# Patient Record
Sex: MALE | Race: WHITE | HISPANIC OR LATINO | Employment: UNEMPLOYED | URBAN - METROPOLITAN AREA
[De-identification: names, ages, dates, MRNs, and addresses within clinical notes are randomized per-mention and may not be internally consistent; named-entity substitution may affect disease eponyms.]

---

## 2017-08-16 ENCOUNTER — HOSPITAL ENCOUNTER (EMERGENCY)
Facility: HOSPITAL | Age: 52
Discharge: HOME | End: 2017-08-16
Attending: EMERGENCY MEDICINE

## 2017-08-16 VITALS
HEIGHT: 72 IN | DIASTOLIC BLOOD PRESSURE: 92 MMHG | RESPIRATION RATE: 16 BRPM | HEART RATE: 97 BPM | OXYGEN SATURATION: 98 % | BODY MASS INDEX: 24.38 KG/M2 | WEIGHT: 180 LBS | TEMPERATURE: 99 F | SYSTOLIC BLOOD PRESSURE: 209 MMHG

## 2017-08-16 DIAGNOSIS — G89.11 ACUTE PAIN OF RIGHT SHOULDER DUE TO TRAUMA: ICD-10-CM

## 2017-08-16 DIAGNOSIS — M25.511 ACUTE PAIN OF RIGHT SHOULDER DUE TO TRAUMA: ICD-10-CM

## 2017-08-16 DIAGNOSIS — K40.90 REDUCIBLE LEFT INGUINAL HERNIA: ICD-10-CM

## 2017-08-16 DIAGNOSIS — M75.81 RIGHT ROTATOR CUFF TENDINITIS: Primary | ICD-10-CM

## 2017-08-16 PROCEDURE — 99283 EMERGENCY DEPT VISIT LOW MDM: CPT

## 2017-08-16 PROCEDURE — 99284 EMERGENCY DEPT VISIT MOD MDM: CPT | Mod: ,,, | Performed by: FAMILY MEDICINE

## 2017-08-16 RX ORDER — METHYLPREDNISOLONE 4 MG/1
TABLET ORAL
Qty: 1 PACKAGE | Refills: 0 | Status: SHIPPED | OUTPATIENT
Start: 2017-08-16 | End: 2017-09-06

## 2017-08-16 NOTE — ED PROVIDER NOTES
Encounter Date: 8/16/2017    SCRIBE #1 NOTE: I, Sanjuanita Thurman, am scribing for, and in the presence of, Dr. Dalton.       History     Chief Complaint   Patient presents with    Shoulder Pain     states injured right shoulder last week     Time seen by provider: 10:12 AM    This is a 51 y.o. male who presents with complaint of RUE and LLE injury and associated pain for approximately 7 days.  He reports inability to sleep due to the pain. He notes decreased ROM in his right shoulder.  The patient indicates that the pain improves with Advil PO.      The history is provided by the patient.     Review of patient's allergies indicates:  No Known Allergies  History reviewed. No pertinent past medical history.  History reviewed. No pertinent surgical history.  Family History   Problem Relation Age of Onset    No Known Problems Mother     No Known Problems Father      Social History   Substance Use Topics    Smoking status: Never Smoker    Smokeless tobacco: Never Used    Alcohol use Yes      Comment: socially     Review of Systems   Constitutional: Positive for fatigue (Inability to sleep due to pain).   Musculoskeletal:        RUE and LLE pain.       Physical Exam     Initial Vitals [08/16/17 0928]   BP Pulse Resp Temp SpO2   (!) 209/92 97 16 98.9 °F (37.2 °C) 98 %      MAP       131         Physical Exam    Nursing note and vitals reviewed.  Constitutional: No distress.   HENT:   Head: Normocephalic and atraumatic.   Cardiovascular: Normal rate, regular rhythm and normal heart sounds. Exam reveals no gallop and no friction rub.    No murmur heard.  Pulmonary/Chest: Breath sounds normal. No respiratory distress. He has no wheezes. He has no rhonchi. He has no rales.   Abdominal: Soft. He exhibits no distension. There is no tenderness. There is no rebound and no guarding.   Musculoskeletal:   Peripheral vascular at baseline.    Extremities: Limited ROM of right shoulder to abduction past 75 degrees. Flexion and internal  rotation at baseline. Strength and coordination in right elbow, wrist, and hand at baseline.    Groin: Left inguinal hernia, easily reducible but uncomfortable to palpation and source of his hip pain.  Full ROM in bilateral hips without pain.   Neurological: He is alert and oriented to person, place, and time. No cranial nerve deficit or sensory deficit.         ED Course   Procedures  Labs Reviewed - No data to display          Medical Decision Making:   History:   Old Medical Records: I decided to obtain old medical records.  Clinical Tests:   Radiological Study: Ordered and Reviewed  ED Management:  Patient discharged on a short course of steroids and recommendations for orthopedic follow-up            Scribe Attestation:   Scribe #1: I performed the above scribed service and the documentation accurately describes the services I performed. I attest to the accuracy of the note.    Attending Attestation:           Physician Attestation for Scribe:  Physician Attestation Statement for Scribe #1: I, Dr. Dalton, reviewed documentation, as scribed by Sanjuanita Thurman in my presence, and it is both accurate and complete.                 ED Course     Clinical Impression:   The primary encounter diagnosis was Right rotator cuff tendinitis. Diagnoses of Acute pain of right shoulder due to trauma and Reducible left inguinal hernia were also pertinent to this visit.    Disposition:   Disposition: Discharged  Condition: Stable                        Melvin Dalton MD  08/29/17 1051

## 2017-08-16 NOTE — DISCHARGE INSTRUCTIONS
You have two problems.      You have injured the tendons in your left shoulder and may have small tears.  Your x-rays are normal, so you are not in danger.  We will place you on a one week course of steroids to try to reduce the inflammation since the Advil is not working.  We will also recommend some low impact exercises to see if that helps your shoulder motion to return to normal.      However, sometimes these injuries can result in permanent reduced function of your shoulder.  You should follow up with our orthopedic dept. or your company worker's comp doctors for further evaluation and management, especially if your movement has not returned to normal after the steroid treatment and exercises. .     You also have a hernia in your left groin.  This is what is causing your pain in this area.  It is not trapped, and does not require emergency surgery.  However, since it is causing you continuous pain, surgery is probably the best option to fix it.  Medicines really do not help.  You should follow up w/ our General Surgery clinic to discuss repair options.

## 2017-08-16 NOTE — ED TRIAGE NOTES
Sustained injury to right shoulder last week while he was doing some heavy lifting.  States that the pain is keeping him up at night.    Also feels like he pulled something in his left groin area.    GENERAL: The patient is well-developed and well-nourished in no apparent distress. Alert and oriented x4.                                                HEENT: Head is normocephalic and atraumatic. Extraocular muscles are intact. Pupils are equal, round, and reactive to light and accommodation. Nares appeared normal. Mouth is well hydrated and without lesions. Mucous membranes are moist. Posterior pharynx clear of any exudate or lesions.    NECK: Supple. No carotid bruits. No lymphadenopathy or thyromegaly.    LUNGS: Clear to auscultation.    HEART: Regular rate and rhythm without murmur.     ABDOMEN: Soft, nontender, and nondistended. Positive bowel sounds. No hepatosplenomegaly was noted.     EXTREMITIES: Without any cyanosis, clubbing, rash, lesions or edema.     NEUROLOGIC: Cranial nerves II through XII are grossly intact.     PSYCHIATRIC: Flat affect, but denies suicidal or homicidal ideations.    SKIN: No ulceration or induration present.

## 2022-02-25 ENCOUNTER — CLINICAL SUPPORT (OUTPATIENT)
Dept: LAB | Facility: HOSPITAL | Age: 57
End: 2022-02-25
Attending: NURSE PRACTITIONER
Payer: MEDICAID

## 2022-02-25 DIAGNOSIS — Z02.79 OTHER ISSUE OF MEDICAL CERTIFICATES: Primary | ICD-10-CM

## 2022-02-25 DIAGNOSIS — Z02.79 OTHER ISSUE OF MEDICAL CERTIFICATES: ICD-10-CM

## 2022-02-25 PROCEDURE — 93010 ELECTROCARDIOGRAM REPORT: CPT | Mod: ,,, | Performed by: INTERNAL MEDICINE

## 2022-02-25 PROCEDURE — 93010 EKG 12-LEAD: ICD-10-PCS | Mod: ,,, | Performed by: INTERNAL MEDICINE

## 2022-02-25 PROCEDURE — 93005 ELECTROCARDIOGRAM TRACING: CPT

## 2024-05-02 ENCOUNTER — HOSPITAL ENCOUNTER (EMERGENCY)
Facility: HOSPITAL | Age: 59
Discharge: HOME | End: 2024-05-02
Attending: EMERGENCY MEDICINE

## 2024-05-02 VITALS
TEMPERATURE: 98 F | OXYGEN SATURATION: 98 % | DIASTOLIC BLOOD PRESSURE: 69 MMHG | HEART RATE: 61 BPM | SYSTOLIC BLOOD PRESSURE: 128 MMHG | RESPIRATION RATE: 18 BRPM

## 2024-05-02 DIAGNOSIS — S68.119A: Primary | ICD-10-CM

## 2024-05-02 PROBLEM — S68.625A: Status: ACTIVE | Noted: 2024-05-02

## 2024-05-02 LAB
HCV AB SERPL QL IA: NORMAL
HIV 1+2 AB+HIV1 P24 AG SERPL QL IA: NORMAL

## 2024-05-02 PROCEDURE — 96366 THER/PROPH/DIAG IV INF ADDON: CPT

## 2024-05-02 PROCEDURE — 99284 EMERGENCY DEPT VISIT MOD MDM: CPT | Mod: 25

## 2024-05-02 PROCEDURE — 11760 REPAIR OF NAIL BED: CPT

## 2024-05-02 PROCEDURE — 86803 HEPATITIS C AB TEST: CPT | Performed by: EMERGENCY MEDICINE

## 2024-05-02 PROCEDURE — 96365 THER/PROPH/DIAG IV INF INIT: CPT

## 2024-05-02 PROCEDURE — 63600175 PHARM REV CODE 636 W HCPCS: Performed by: EMERGENCY MEDICINE

## 2024-05-02 PROCEDURE — 87389 HIV-1 AG W/HIV-1&-2 AB AG IA: CPT | Performed by: EMERGENCY MEDICINE

## 2024-05-02 PROCEDURE — 25000003 PHARM REV CODE 250: Performed by: EMERGENCY MEDICINE

## 2024-05-02 RX ORDER — OXYCODONE AND ACETAMINOPHEN 5; 325 MG/1; MG/1
1 TABLET ORAL EVERY 6 HOURS PRN
Qty: 8 TABLET | Refills: 0 | Status: SHIPPED | OUTPATIENT
Start: 2024-05-02

## 2024-05-02 RX ORDER — IBUPROFEN 400 MG/1
400 TABLET ORAL EVERY 6 HOURS PRN
Qty: 20 TABLET | Refills: 0 | Status: SHIPPED | OUTPATIENT
Start: 2024-05-02

## 2024-05-02 RX ORDER — LIDOCAINE HYDROCHLORIDE 10 MG/ML
10 INJECTION, SOLUTION EPIDURAL; INFILTRATION; INTRACAUDAL; PERINEURAL
Status: DISCONTINUED | OUTPATIENT
Start: 2024-05-02 | End: 2024-05-02 | Stop reason: HOSPADM

## 2024-05-02 RX ORDER — SULFAMETHOXAZOLE AND TRIMETHOPRIM 800; 160 MG/1; MG/1
1 TABLET ORAL 2 TIMES DAILY
Qty: 14 TABLET | Refills: 0 | Status: SHIPPED | OUTPATIENT
Start: 2024-05-02 | End: 2024-05-12

## 2024-05-02 RX ADMIN — CEFAZOLIN 2 G: 2 INJECTION, POWDER, FOR SOLUTION INTRAMUSCULAR; INTRAVENOUS at 04:05

## 2024-05-02 NOTE — PROVIDER PROGRESS NOTES - EMERGENCY DEPT.
Encounter Date: 5/2/2024    ED Physician Progress Notes        ED Course: I, Gary Camilo MD have assumed care of this patient from Dr. Foster. Patient is a 58-year-old male presenting with left hand 4th digit distal amputation.    At the time of signout plan was pending orthopedics consultation recommendations..    Patient seen by orthopedics and cared for patient placed in bandages plan for patient DC home with Bactrim follow-up wound 1 week as outpatient.    Spoke with patient understanding of plan will DC home given return precautions including worsening pain, fevers, chills, abnormal bleeding, discharge new onset numbness or tingling.  Patient understanding of plan safe for DC at this time.  6:03 PM      Medications given in the ED:    Medications   LIDOcaine (PF) 10 mg/ml (1%) injection 100 mg (has no administration in time range)   ceFAZolin 2 g in dextrose 5 % in water (D5W) 50 mL IVPB (MB+) (2 g Intravenous New Bag 5/2/24 1640)       Disposition: d/c home     Impression: left hand 4th digit distal amputation

## 2024-05-02 NOTE — ED PROVIDER NOTES
Encounter Date: 5/2/2024       History     Chief Complaint   Patient presents with    Hand Injury     Left hand, bleeding controlled, tip of 4th digit cut off      Patient is a 58-year-old M with PMH of who presents with pain and amputation of L 4th finger involving the nail bed that occurred today after he was pressing wood into a .   Is R hand dominant    Had T dap in 2020    The history is provided by the patient.     Review of patient's allergies indicates:  No Known Allergies  No past medical history on file.  No past surgical history on file.  Family History   Problem Relation Name Age of Onset    No Known Problems Mother      No Known Problems Father       Social History     Tobacco Use    Smoking status: Never    Smokeless tobacco: Never   Substance Use Topics    Alcohol use: Yes     Comment: socially    Drug use: No         Physical Exam     Initial Vitals [05/02/24 1503]   BP Pulse Resp Temp SpO2   (!) 153/71 85 18 97.9 °F (36.6 °C) 95 %      MAP       --         Physical Exam    Nursing note and vitals reviewed.  Constitutional: He appears well-developed and well-nourished. He is not diaphoretic. No distress.   HENT:   Head: Normocephalic and atraumatic.   Eyes: EOM are normal.   Neck: Neck supple.   Normal range of motion.  Cardiovascular:  Normal rate and regular rhythm.           Pulmonary/Chest: No respiratory distress.   Abdominal: He exhibits no distension.   Musculoskeletal:         General: Normal range of motion.      Cervical back: Normal range of motion and neck supple.      Comments: Amputation through L 4th finger through nail bed     Neurological: He is alert and oriented to person, place, and time. GCS score is 15. GCS eye subscore is 4. GCS verbal subscore is 5. GCS motor subscore is 6.   Skin: Skin is warm and dry.   Psychiatric: He has a normal mood and affect. His behavior is normal. Judgment and thought content normal.         ED Course   Procedures  Labs Reviewed   HIV 1 / 2  ANTIBODY    Narrative:     Release to patient->Immediate   HEPATITIS C ANTIBODY    Narrative:     Release to patient->Immediate          Imaging Results               X-Ray Hand 3 View Left (Final result)  Result time 05/02/24 17:50:26      Final result by Heber Delacruz MD (05/02/24 17:50:26)                   Impression:      1. Amputation injury at the distal margin of the distal phalanx of the 4th digit with associated soft tissue injury.  2. Probable small foreign bodies in the soft tissues adjacent to the distal phalanx of the 1st digit and dorsal surface of the 4th digit at the proximal interphalangeal joint.  Recommend clinical correlation.  3. This report was flagged in Epic as abnormal.      Electronically signed by: Heber Delacruz  Date:    05/02/2024  Time:    17:50               Narrative:    EXAMINATION:  XR HAND COMPLETE 3 VIEW LEFT    CLINICAL HISTORY:  left 4th finger amputation;.    TECHNIQUE:  PA, lateral, and oblique views of the left hand were performed.    COMPARISON:  None    FINDINGS:  Soft tissue injury with blunting of the distal margin of the distal phalanx of the 4th digit consistent with amputation injury.    No significant abnormality elsewhere.  No mass is detected.    Small metallic foreign body adjacent to the distal phalanx of the 1st digit.  Recommend clinical correlation.  There is a tiny punctate focus in the soft tissues at the dorsal surface of the 4th digit at the proximal interphalangeal joint level seen on lateral view, possibly at tiny punctate foreign body.  Recommend clinical correlation and follow-up.                                       Medications   ceFAZolin 2 g in dextrose 5 % in water (D5W) 50 mL IVPB (MB+) (0 g Intravenous Stopped 5/2/24 1820)     Medical Decision Making  DDX: open fracture, nail bed injury, amputation    Patient given ancef 2 grams iv, xray ordered, ortho consulted  Tetanus was up to date  Patient care transferred to Dr. Camilo pending ortho  consult    Amount and/or Complexity of Data Reviewed  Labs: ordered.  Radiology: ordered.                                      Clinical Impression:  Final diagnoses:  [S68.119A] Amputation of digit of left hand, initial encounter (Primary)          ED Disposition Condition    Discharge Stable          ED Prescriptions       Medication Sig Dispense Start Date End Date Auth. Provider    sulfamethoxazole-trimethoprim 800-160mg (BACTRIM DS) 800-160 mg Tab Take 1 tablet by mouth 2 (two) times daily. for 10 days 14 tablet 5/2/2024 5/12/2024 Gary Camilo Jr., MD    oxyCODONE-acetaminophen (PERCOCET) 5-325 mg per tablet Take 1 tablet by mouth every 6 (six) hours as needed for Pain. 8 tablet 5/2/2024 -- Gary Camilo Jr., MD    ibuprofen (ADVIL,MOTRIN) 400 MG tablet Take 1 tablet (400 mg total) by mouth every 6 (six) hours as needed. 20 tablet 5/2/2024 -- Gary Camilo Jr., MD          Follow-up Information       Follow up With Specialties Details Why Contact Info    Maria Luz Boykin MD ([facility]) Family Medicine In 1 week  [address] [facility] [address] [phone]      [facility] - Emergency Dept Emergency Medicine  If symptoms worsen [id number]  [facility] [address] [phone]             Sarah Foster MD  05/04/24 0054

## 2024-05-02 NOTE — ED NOTES
Patient identifiers verified and correct for Fili Brancho.   LOC: The patient is awake, alert and aware of environment with an appropriate affect, the patient is oriented x 3 and speaking appropriately.   APPEARANCE: Patient appears comfortable and in no acute distress, patient is clean and well groomed.  SKIN: The skin is warm and dry, color consistent with ethnicity, patient has normal skin turgor and moist mucus membranes, Pt lacerated tip of left ring finger.  MUSCULOSKELETAL: Patient moving all extremities spontaneously, no swelling noted.  RESPIRATORY: Airway is open and patent, respirations are spontaneous, patient has a normal effort and rate, no accessory muscle use noted, O2 sats noted at 95% on room air.  CARDIAC: Patient has a normal rate and regular rhythm, no edema noted, capillary refill < 3 seconds.   GASTRO: Soft and non tender to palpation, no distention noted, normoactive bowel sounds present in all four quadrants. Pt states bowel movements have been regular.  : Pt denies any pain or frequency with urination.  NEURO: Pt opens eyes spontaneously, behavior appropriate to situation, follows commands, facial expression symmetrical, bilateral hand grasp equal and even, purposeful motor response noted, normal sensation in all extremities when touched with a finger.    Ring removed from left ring finger.

## 2024-05-02 NOTE — Clinical Note
"Fili Mahajangallito Stallings was seen and treated in our emergency department on 5/2/2024.  He may return to work on 05/09/2024.  Follow up with hand specialist in 1 week for further recommendations     If you have any questions or concerns, please don't hesitate to call.       LPN    "

## 2024-05-02 NOTE — DISCHARGE INSTRUCTIONS
Please return if having any worsening pain, numbness, tingling, fevers chills notice any abnormal pus or bleeding from wound.  Please follow-up in 1 week with a hand specialist [phone]

## 2024-05-03 NOTE — SUBJECTIVE & OBJECTIVE
No past medical history on file.    No past surgical history on file.    Review of patient's allergies indicates:  No Known Allergies    No current facility-administered medications for this encounter.     Current Outpatient Medications   Medication Sig Dispense Refill    ibuprofen (ADVIL,MOTRIN) 400 MG tablet Take 1 tablet (400 mg total) by mouth every 6 (six) hours as needed. 20 tablet 0    oxyCODONE-acetaminophen (PERCOCET) 5-325 mg per tablet Take 1 tablet by mouth every 6 (six) hours as needed for Pain. 8 tablet 0    sulfamethoxazole-trimethoprim 800-160mg (BACTRIM DS) 800-160 mg Tab Take 1 tablet by mouth 2 (two) times daily. for 10 days 14 tablet 0     Family History       Problem Relation (Age of Onset)    No Known Problems Mother, Father          Tobacco Use    Smoking status: Never    Smokeless tobacco: Never   Substance and Sexual Activity    Alcohol use: Yes     Comment: socially    Drug use: No    Sexual activity: Yes     Partners: Female     Birth control/protection: None     ROS  Constitutional: negative for fevers  Eyes: negative visual changes  ENT: negative for hearing loss  Respiratory: negative for dyspnea  Cardiovascular: negative for chest pain  Gastrointestinal: negative for abdominal pain  Genitourinary: negative for dysuria  Neurological: negative for headaches  Behavioral/Psych: negative for hallucinations  Endocrine: negative for temperature intolerance    Objective:     Vital Signs (Most Recent):  Temp: 97.9 °F (36.6 °C) (05/02/24 1736)  Pulse: 61 (05/02/24 1736)  Resp: 18 (05/02/24 1736)  BP: 128/69 (05/02/24 1736)  SpO2: 98 % (05/02/24 1736) Vital Signs (24h Range):  Temp:  [97.9 °F (36.6 °C)] 97.9 °F (36.6 °C)  Pulse:  [61-85] 61  Resp:  [18] 18  SpO2:  [95 %-98 %] 98 %  BP: (128-153)/(69-71) 128/69           There is no height or weight on file to calculate BMI.      Intake/Output Summary (Last 24 hours) at 5/2/2024 5942  Last data filed at 5/2/2024 1820  Gross per 24 hour   Intake 50  ml   Output --   Net 50 ml        Ortho/SPM Exam  General:  no acute distress, appears stated age   Neuro: alert and oriented x3  Psych: normal mood  Head: normocephalic, atraumatic.  Eyes: no scleral icterus  Mouth: moist mucous membranes  Cardiovascular: extremities warm and well perfused  Lungs: breathing comfortably, equal chest rise bilat  Skin: clean, dry, intact (any exceptions noted in below musculoskeletal exam)    MSK:  RUE:  - Skin intact throughout, no open wounds  - No swelling  - No ecchymosis, erythema, or signs of cellulitis  - NonTTP throughout  - ROM intact without pain  - SILT throughout  - Compartments soft  - Radial artery palpated   - Capillary Refill <3s    LUE:  - Partial amputation of distal tip of 4th digit  - Moderate swelling to 4th digit  - NonTTP throughout  - AROM and PROM of the shoulder, elbow, wrist, and hand intact without pain  - Axillary/AIN/PIN/Radial/Median/Ulnar Nerves assessed in isolation without deficit  - SILT throughout  - Compartments soft  - Radial artery palpated   - Capillary Refill <3s    Bilateral LE:  ROM grossly intact without pain  NonTTP throughout         Significant Labs:   All pertinent labs within the past 24 hours have been reviewed.    Significant Imaging: I have reviewed and interpreted all pertinent imaging results/findings.  XR L hand: amputation to distal tip of distal phalanx, small foreign body over distal phalanx of first digit

## 2024-05-03 NOTE — CONSULTS
[facility] - Emergency Dept  Orthopedics  Consult Note    Patient Name: Fili Stallings  MRN: [id number]  Admission Date: 5/2/2024  Hospital Length of Stay: 0 days  Attending Provider: No att. providers found  Primary Care Provider: Maria Luz Boykin MD (Cindy)        Consults  Subjective:     Principal Problem:Partial traumatic amputation of left ring finger through phalanx    Chief Complaint:   Chief Complaint   Patient presents with    Hand Injury     Left hand, bleeding controlled, tip of 4th digit cut off         HPI: Fili Stallings is a 58 y.o. male with PMH significant for HTN, HLD and multiple hand/forearm lacerations at work presenting with a partial amputation of left distal phalanx of fourth digit. Patient was working with a wood jointer when his finger got caught in the machine. Patient denies numbness and tingling. Denies any other musculoskeletal pain or injuries. No known history of prior left 4th finger injury or surgery. Patient is right handed. TDAP is UTD. He received 2g of Ancef in the ED.     They deny IV drug use.  They deny tobacco use.   They endorse occasional alcohol use.   They deny immunosuppressant medications.  They deny chemotherapy.  They deny radiation therapy.       No past medical history on file.    No past surgical history on file.    Review of patient's allergies indicates:  No Known Allergies    No current facility-administered medications for this encounter.     Current Outpatient Medications   Medication Sig Dispense Refill    ibuprofen (ADVIL,MOTRIN) 400 MG tablet Take 1 tablet (400 mg total) by mouth every 6 (six) hours as needed. 20 tablet 0    oxyCODONE-acetaminophen (PERCOCET) 5-325 mg per tablet Take 1 tablet by mouth every 6 (six) hours as needed for Pain. 8 tablet 0    sulfamethoxazole-trimethoprim 800-160mg (BACTRIM DS) 800-160 mg Tab Take 1 tablet by mouth 2 (two) times daily. for 10 days 14 tablet 0     Family History       Problem Relation (Age of Onset)    No Known  Problems Mother, Father          Tobacco Use    Smoking status: Never    Smokeless tobacco: Never   Substance and Sexual Activity    Alcohol use: Yes     Comment: socially    Drug use: No    Sexual activity: Yes     Partners: Female     Birth control/protection: None     ROS  Constitutional: negative for fevers  Eyes: negative visual changes  ENT: negative for hearing loss  Respiratory: negative for dyspnea  Cardiovascular: negative for chest pain  Gastrointestinal: negative for abdominal pain  Genitourinary: negative for dysuria  Neurological: negative for headaches  Behavioral/Psych: negative for hallucinations  Endocrine: negative for temperature intolerance    Objective:     Vital Signs (Most Recent):  Temp: 97.9 °F (36.6 °C) (05/02/24 1736)  Pulse: 61 (05/02/24 1736)  Resp: 18 (05/02/24 1736)  BP: 128/69 (05/02/24 1736)  SpO2: 98 % (05/02/24 1736) Vital Signs (24h Range):  Temp:  [97.9 °F (36.6 °C)] 97.9 °F (36.6 °C)  Pulse:  [61-85] 61  Resp:  [18] 18  SpO2:  [95 %-98 %] 98 %  BP: (128-153)/(69-71) 128/69           There is no height or weight on file to calculate BMI.      Intake/Output Summary (Last 24 hours) at 5/2/2024 2159  Last data filed at 5/2/2024 1820  Gross per 24 hour   Intake 50 ml   Output --   Net 50 ml        Ortho/SPM Exam  General:  no acute distress, appears stated age   Neuro: alert and oriented x3  Psych: normal mood  Head: normocephalic, atraumatic.  Eyes: no scleral icterus  Mouth: moist mucous membranes  Cardiovascular: extremities warm and well perfused  Lungs: breathing comfortably, equal chest rise bilat  Skin: clean, dry, intact (any exceptions noted in below musculoskeletal exam)    MSK:  RUE:  - Skin intact throughout, no open wounds  - No swelling  - No ecchymosis, erythema, or signs of cellulitis  - NonTTP throughout  - ROM intact without pain  - SILT throughout  - Compartments soft  - Radial artery palpated   - Capillary Refill <3s    LUE:  - Partial amputation of distal tip of  4th digit  - Moderate swelling to 4th digit  - NonTTP throughout  - AROM and PROM of the shoulder, elbow, wrist, and hand intact without pain  - Axillary/AIN/PIN/Radial/Median/Ulnar Nerves assessed in isolation without deficit  - SILT throughout  - Compartments soft  - Radial artery palpated   - Capillary Refill <3s    Bilateral LE:  ROM grossly intact without pain  NonTTP throughout         Significant Labs:   All pertinent labs within the past 24 hours have been reviewed.    Significant Imaging: I have reviewed and interpreted all pertinent imaging results/findings.  XR L hand: amputation to distal tip of distal phalanx, small foreign body over distal phalanx of first digit  Assessment/Plan:     * Partial traumatic amputation of left ring finger through phalanx  Fili Stallings is a 58 y.o. male with PMH of HTN, HLD and multiple lacerations to bilateral hands/forearms presenting with a left 4th digit distal phalanx partial amputation. Patient was given 2g of ancef in the ED. TDAP is up to date. Wound was irrigated and explored. See procedure note for further details.    - Patient placed in soft dressing  - Bactrim BID x10 days  - NWB LUE  - Leave dressing clean, dry, intact until follow up visit  - Rest, ice, elevation  - Multimodal pain control  - The patient was presented several options for follow-up including care at [facility], but the patient did not desire further care here.  Follow up was then offered at [facility] and [facility].  Patient has elected to seek follow up care at [facility].  Contact number was taken and the patient will be contacted for a follow up appointment.  Patient is being discharged with paperwork including all diagnostic workup while here at [facility] and instructed to bring to to their follow up appointment. Patient was in full understanding and agreement with this plan.      Procedure Note: Left 4th digit nail bed repair  Patient was explained risks, benefits, and alternatives to  treatment and verbalized consent to proceed. Patient and location was confirmed.  5 cc of 1% lidocaine was injected for a digital block after local cleaning with alcohol swabs/betadine/CHG. Distal extremity was cleansed with betadine and draped with blue towels. Nail bed was removed from nail bed using hemostat. Wound was irrigated with 2 L of normal saline. No exposed bone visualized. Small laceration to nailbed noted that was repaired with a 5/0 chromic gut. Insufficient soft tissue distally for wound closure. Eponychial fold stented open with xeroform and distal phalanx covered with xeroform.  Wound was dressed with 4x4, cast padding, ACE bandage. No complications were noted.         .              MENDOZA Billingsley MD  Orthopedics  [facility] - Emergency Dept

## 2024-05-03 NOTE — HPI
Fili Stallings is a 58 y.o. male with PMH significant for HTN, HLD and multiple hand/forearm lacerations at work presenting with a partial amputation of left distal phalanx of fourth digit. Patient was working with a wood jointer when his finger got caught in the machine. Patient denies numbness and tingling. Denies any other musculoskeletal pain or injuries. No known history of prior left 4th finger injury or surgery. Patient is right handed. TDAP is UTD. He received 2g of Ancef in the ED.     They deny IV drug use.  They deny tobacco use.   They endorse occasional alcohol use.   They deny immunosuppressant medications.  They deny chemotherapy.  They deny radiation therapy.

## 2024-05-03 NOTE — ASSESSMENT & PLAN NOTE
Fili Stallings is a 58 y.o. male with PMH of HTN, HLD and multiple lacerations to bilateral hands/forearms presenting with a left 4th digit distal phalanx partial amputation. Patient was given 2g of ancef in the ED. TDAP is up to date. Wound was irrigated and explored. See procedure note for further details.    - Patient placed in soft dressing  - Bactrim BID x10 days  - NWB LUE  - Leave dressing clean, dry, intact until follow up visit  - Rest, ice, elevation  - Multimodal pain control  - The patient was presented several options for follow-up including care at [facility], but the patient did not desire further care here.  Follow up was then offered at [facility] and [facility].  Patient has elected to seek follow up care at [facility].  Contact number was taken and the patient will be contacted for a follow up appointment.  Patient is being discharged with paperwork including all diagnostic workup while here at [facility] and instructed to bring to to their follow up appointment. Patient was in full understanding and agreement with this plan.      Procedure Note: Left 4th digit nail bed repair  Patient was explained risks, benefits, and alternatives to treatment and verbalized consent to proceed. Patient and location was confirmed.  5 cc of 1% lidocaine was injected for a digital block after local cleaning with alcohol swabs/betadine/CHG. Distal extremity was cleansed with betadine and draped with blue towels. Nail bed was removed from nail bed using hemostat. Wound was irrigated with 2 L of normal saline. No exposed bone visualized. Small laceration to nailbed noted that was repaired with a 5/0 chromic gut. Insufficient soft tissue distally for wound closure. Eponychial fold stented open with xeroform and distal phalanx covered with xeroform.  Wound was dressed with 4x4, cast padding, ACE bandage. No complications were noted.         .